# Patient Record
(demographics unavailable — no encounter records)

---

## 2024-10-30 NOTE — PLAN
[FreeTextEntry1] : #Well person exam -Medical/surgical/family history reviewed -Allergies and medications reconciled -Reviewed breast awareness/calcium/vitamin D/weight bearing exercise   #Health Care Maintenance -Mammogram + breast sono rx given -Colonoscopy due next year  #hair loss -discussed biotin supplementation with pt -pt recently had thyroid testing performed which was wnl -recommended pt speak with her PCP or see a dermatologist for hair loss  #R abdominal/hip/back pain -s/p multiple imaging studies throughout the years wnl -recommended speaking to PT for hip/lower back strengthening -continue following w/ PCP  #left axillary pain -physical exam wnl -recommended pt monitor symptoms & if notices mass, to call for left breast sono rx -recommended warm compresses/decreased shaving   All questions/concerns of pt addressed to their satisfaction.   F/u in 1 yr or sooner PRN.

## 2024-10-30 NOTE — PHYSICAL EXAM
[Appropriately responsive] : appropriately responsive [Alert] : alert [No Acute Distress] : no acute distress [Soft] : soft [Non-tender] : non-tender [Non-distended] : non-distended [Oriented x3] : oriented x3 [Examination Of The Breasts] : a normal appearance [No Masses] : no breast masses were palpable [Labia Majora] : normal [Labia Minora] : normal [Normal] : normal [Absent] : absent [FreeTextEntry6] : no LAD of axillary lymph nodes noted, nontender [Tenderness] : nontender [Uterine Adnexae] : normal  [No Tenderness] : no tenderness [Nl Sphincter Tone] : normal sphincter tone [Internal Hemorrhoid] : internal hemorrhoid [FreeTextEntry9] : guaiac neg

## 2024-10-30 NOTE — HISTORY OF PRESENT ILLNESS
[FreeTextEntry1] : Pt is a 53 year  h/o JOBY-RSO presenting today for annual exam.   Pt with c/o left axillary pain & increased hair loss. L axillary pain has been present for 2 weeks, denies feeling lumps in the area, denies nipple discharge or breast pain. Pt is taking "hair, nail, & skin" supplement for her hair loss. Pt c/o IBS flare earlier this week & generalized RLQ discomfort. Pt also w/ right hip and back pain. Pt denies abnormal vaginal bleeding, vaginal discharge/irritation, dysuria, dyspareunia. Pt is sexually active with her male partner. Pt feels safe & supported in her relationship Pt reports regular exercise. Pt reports balanced diet.  PHQ9: 0  Obhx: c/s x1 (), missed AB GYNhx: menorrhagia, fibroids, R ovarian cyst PMH: GERD, arthritis, anemia, Lyme disease PSH: JOBY RSO for menorrhagia, fibroids, and right ovarian cyst (2018), c/s x1 (), D&C Med: denies All: Betadine solution, NKDA Soc: denies t/e/d Psych: denies Famhx: mother uterine ca dx in 2021, father w/ prostate ca